# Patient Record
Sex: MALE | NOT HISPANIC OR LATINO
[De-identification: names, ages, dates, MRNs, and addresses within clinical notes are randomized per-mention and may not be internally consistent; named-entity substitution may affect disease eponyms.]

---

## 2019-09-16 ENCOUNTER — RX ONLY (RX ONLY)
Age: 49
End: 2019-09-16

## 2019-09-16 ENCOUNTER — SEE NOTE (OUTPATIENT)
Dept: URBAN - METROPOLITAN AREA CLINIC 31 | Facility: CLINIC | Age: 49
Setting detail: DERMATOLOGY
End: 2019-09-16

## 2019-09-16 DIAGNOSIS — D49.2 NEOPLASM OF UNSPECIFIED BEHAVIOR OF BONE, SOFT TISSUE, AND SKIN: ICD-10-CM

## 2019-09-16 PROBLEM — L82.1 OTHER SEBORRHEIC KERATOSIS: Status: RESOLVED | Noted: 2019-09-16

## 2019-09-16 PROBLEM — L82.1 OTHER SEBORRHEIC KERATOSIS: Status: ACTIVE | Noted: 2019-09-16

## 2019-09-16 PROBLEM — D18.01 HEMANGIOMA OF SKIN AND SUBCUTANEOUS TISSUE: Status: RESOLVED | Noted: 2019-09-16

## 2019-09-16 PROBLEM — L57.0 ACTINIC KERATOSIS: Status: RESOLVED | Noted: 2019-09-16

## 2019-09-16 PROBLEM — D18.01 HEMANGIOMA OF SKIN AND SUBCUTANEOUS TISSUE: Status: ACTIVE | Noted: 2019-09-16

## 2019-09-16 PROCEDURE — 99213 OFFICE O/P EST LOW 20 MIN: CPT

## 2019-09-16 PROCEDURE — 17000 DESTRUCT PREMALG LESION: CPT

## 2019-09-16 PROCEDURE — 17003 DESTRUCT PREMALG LES 2-14: CPT

## 2019-09-16 RX ORDER — FLUOCINOLONE ACETONIDE 0.1 MG/ML
1 ML SOLUTION TOPICAL DAILY
Qty: 60 | Refills: 0
Start: 2019-09-16

## 2019-09-16 RX ORDER — FLUOCINONIDE 0.5 MG/ML
1 ML SOLUTION TOPICAL DAILY
Qty: 20 | Refills: 0
Start: 2019-09-16

## 2019-09-16 RX ORDER — TRIAMCINOLONE ACETONIDE 1 MG/G
1 APPLICATION CREAM TOPICAL BID
Qty: 80 | Refills: 0
Start: 2019-09-16

## 2019-12-16 ENCOUNTER — RX ONLY (RX ONLY)
Age: 49
End: 2019-12-16

## 2019-12-16 RX ORDER — CLOBETASOL PROPIONATE 0.5 MG/G
1 APPLICATION CREAM TOPICAL BID
Qty: 45 | Refills: 0
Start: 2019-12-16

## 2019-12-18 ENCOUNTER — SEE NOTE (OUTPATIENT)
Dept: URBAN - METROPOLITAN AREA CLINIC 36 | Facility: CLINIC | Age: 49
Setting detail: DERMATOLOGY
End: 2019-12-18

## 2019-12-18 DIAGNOSIS — L90.5 SCAR CONDITIONS AND FIBROSIS OF SKIN: ICD-10-CM

## 2019-12-18 PROBLEM — L57.0 ACTINIC KERATOSIS: Status: RESOLVED | Noted: 2019-12-18

## 2019-12-18 PROCEDURE — 11104 PUNCH BX SKIN SINGLE LESION: CPT

## 2019-12-18 PROCEDURE — 11105 PUNCH BX SKIN EA SEP/ADDL: CPT

## 2019-12-18 PROCEDURE — 17000 DESTRUCT PREMALG LESION: CPT

## 2019-12-26 ENCOUNTER — FOLLOW UP (OUTPATIENT)
Dept: URBAN - METROPOLITAN AREA CLINIC 36 | Facility: CLINIC | Age: 49
Setting detail: DERMATOLOGY
End: 2019-12-26

## 2019-12-26 DIAGNOSIS — D22.5 MELANOCYTIC NEVI OF TRUNK: ICD-10-CM

## 2019-12-26 PROCEDURE — 99024 POSTOP FOLLOW-UP VISIT: CPT

## 2020-06-25 ENCOUNTER — RX ONLY (RX ONLY)
Age: 50
End: 2020-06-25

## 2020-06-25 RX ORDER — UREA 40 G/100G
1 APPLICATION CREAM TOPICAL BID
Qty: 200 | Refills: 2
Start: 2020-06-25

## 2022-09-08 ENCOUNTER — APPOINTMENT (OUTPATIENT)
Dept: URBAN - METROPOLITAN AREA CLINIC 206 | Age: 52
Setting detail: DERMATOLOGY
End: 2022-09-08

## 2022-09-08 DIAGNOSIS — L82.0 INFLAMED SEBORRHEIC KERATOSIS: ICD-10-CM

## 2022-09-08 PROCEDURE — OTHER BENIGN DESTRUCTION: OTHER

## 2022-09-08 PROCEDURE — OTHER ADDITIONAL NOTES: OTHER

## 2022-09-08 PROCEDURE — OTHER COUNSELING: OTHER

## 2022-09-08 PROCEDURE — 17110 DESTRUCT B9 LESION 1-14: CPT

## 2022-09-08 ASSESSMENT — LOCATION ZONE DERM: LOCATION ZONE: TRUNK

## 2022-09-08 ASSESSMENT — LOCATION SIMPLE DESCRIPTION DERM: LOCATION SIMPLE: UPPER BACK

## 2022-09-08 ASSESSMENT — LOCATION DETAILED DESCRIPTION DERM: LOCATION DETAILED: INFERIOR THORACIC SPINE

## 2022-09-08 NOTE — PROCEDURE: BENIGN DESTRUCTION
Render Note In Bullet Format When Appropriate: No
Medical Necessity Information: It is in your best interest to select a reason for this procedure from the list below. All of these items fulfill various CMS LCD requirements except the new and changing color options.
Render Post-Care Instructions In Note?: yes
Treatment Number (Will Not Render If 0): 0
Post-Care Instructions: I reviewed with the patient in detail post-care instructions. Patient is to wear sunprotection, and avoid picking at any of the treated lesions. Pt may apply Vaseline to crusted or scabbing areas.
Anesthesia Volume In Cc: 0.5
Detail Level: Detailed
Medical Necessity Clause: This procedure was medically necessary because the lesions that were treated were:
Consent: The patient's consent was obtained including but not limited to risks of crusting, scabbing, blistering, scarring, darker or lighter pigmentary change, recurrence, incomplete removal and infection.

## 2022-09-08 NOTE — PROCEDURE: ADDITIONAL NOTES
Detail Level: Simple
Render Risk Assessment In Note?: no
Additional Notes: Curetted with 3mm. Photo taken today. Instructed patient to RTO for LN2 treatment if not resolved. Wound care advised. Keep covered with Vaseline and band aid until fully healed. Avoid unsanitary water. Sun screen SPF 30+ daily. Monitor for changes.

## 2023-06-05 ENCOUNTER — APPOINTMENT (OUTPATIENT)
Dept: URBAN - METROPOLITAN AREA CLINIC 206 | Age: 53
Setting detail: DERMATOLOGY
End: 2023-06-08

## 2023-06-05 DIAGNOSIS — D485 NEOPLASM OF UNCERTAIN BEHAVIOR OF SKIN: ICD-10-CM

## 2023-06-05 DIAGNOSIS — D18.0 HEMANGIOMA: ICD-10-CM

## 2023-06-05 PROBLEM — D48.5 NEOPLASM OF UNCERTAIN BEHAVIOR OF SKIN: Status: ACTIVE | Noted: 2023-06-05

## 2023-06-05 PROBLEM — D18.01 HEMANGIOMA OF SKIN AND SUBCUTANEOUS TISSUE: Status: ACTIVE | Noted: 2023-06-05

## 2023-06-05 PROCEDURE — OTHER SHAVE REMOVAL: OTHER

## 2023-06-05 PROCEDURE — 11301 SHAVE SKIN LESION 0.6-1.0 CM: CPT

## 2023-06-05 PROCEDURE — 99212 OFFICE O/P EST SF 10 MIN: CPT | Mod: 25

## 2023-06-05 PROCEDURE — OTHER COUNSELING: OTHER

## 2023-06-05 PROCEDURE — OTHER ADDITIONAL NOTES: OTHER

## 2023-06-05 PROCEDURE — OTHER MIPS QUALITY: OTHER

## 2023-06-05 ASSESSMENT — LOCATION ZONE DERM: LOCATION ZONE: TRUNK

## 2023-06-05 ASSESSMENT — LOCATION SIMPLE DESCRIPTION DERM
LOCATION SIMPLE: LEFT UPPER BACK
LOCATION SIMPLE: ABDOMEN

## 2023-06-05 ASSESSMENT — LOCATION DETAILED DESCRIPTION DERM
LOCATION DETAILED: EPIGASTRIC SKIN
LOCATION DETAILED: LEFT MID-UPPER BACK

## 2023-06-05 NOTE — PROCEDURE: ADDITIONAL NOTES
Detail Level: Simple
Additional Notes: Base treated with liquid nitrogen
Render Risk Assessment In Note?: no
Additional Notes: Discussed removal with vbeam laser. $75 for one spot and $150 for 2-10. Not bothersome to patient at this time.

## 2023-08-28 ENCOUNTER — APPOINTMENT (OUTPATIENT)
Dept: URBAN - METROPOLITAN AREA CLINIC 207 | Age: 53
Setting detail: DERMATOLOGY
End: 2023-08-29

## 2023-08-28 DIAGNOSIS — B07.8 OTHER VIRAL WARTS: ICD-10-CM

## 2023-08-28 DIAGNOSIS — L82.1 OTHER SEBORRHEIC KERATOSIS: ICD-10-CM

## 2023-08-28 PROCEDURE — OTHER MIPS QUALITY: OTHER

## 2023-08-28 PROCEDURE — 99212 OFFICE O/P EST SF 10 MIN: CPT | Mod: 25

## 2023-08-28 PROCEDURE — OTHER LIQUID NITROGEN: OTHER

## 2023-08-28 PROCEDURE — OTHER COUNSELING: OTHER

## 2023-08-28 PROCEDURE — OTHER ADDITIONAL NOTES: OTHER

## 2023-08-28 PROCEDURE — 17110 DESTRUCT B9 LESION 1-14: CPT

## 2023-08-28 ASSESSMENT — LOCATION DETAILED DESCRIPTION DERM
LOCATION DETAILED: RIGHT PROXIMAL PRETIBIAL REGION
LOCATION DETAILED: RIGHT RADIAL DORSAL HAND

## 2023-08-28 ASSESSMENT — LOCATION ZONE DERM
LOCATION ZONE: HAND
LOCATION ZONE: LEG

## 2023-08-28 ASSESSMENT — LOCATION SIMPLE DESCRIPTION DERM
LOCATION SIMPLE: RIGHT HAND
LOCATION SIMPLE: RIGHT PRETIBIAL REGION

## 2023-08-28 NOTE — PROCEDURE: LIQUID NITROGEN
Medical Necessity Information: It is in your best interest to select a reason for this procedure from the list below. All of these items fulfill various CMS LCD requirements except the new and changing color options.
Duration Of Freeze Thaw-Cycle (Seconds): 5
Show Spray Paint Technique Variable?: Yes
Detail Level: Detailed
Post-Care Instructions: I reviewed with the patient in detail post-care instructions. Patient is to wear sunprotection, and avoid picking at any of the treated lesions. Pt may apply Vaseline to crusted or scabbing areas.
Spray Paint Text: The liquid nitrogen was applied to the skin utilizing a spray paint frosting technique.
Number Of Freeze-Thaw Cycles: 2 freeze-thaw cycles
Spray Paint Technique: No
Medical Necessity Clause: This procedure was medically necessary because the lesions that were treated were:
Consent: The patient's consent was obtained including but not limited to risks of crusting, scabbing, blistering, scarring, darker or lighter pigmentary change, recurrence, incomplete removal and infection.
Application Tool (Optional): Liquid Nitrogen Sprayer

## 2023-08-28 NOTE — PROCEDURE: ADDITIONAL NOTES
Render Risk Assessment In Note?: no
Detail Level: Generalized
Additional Notes: Recommended Amlactin lotion once a day(legs)

## 2023-10-25 ENCOUNTER — APPOINTMENT (OUTPATIENT)
Dept: URBAN - METROPOLITAN AREA CLINIC 207 | Age: 53
Setting detail: DERMATOLOGY
End: 2023-10-25

## 2023-10-25 DIAGNOSIS — B07.8 OTHER VIRAL WARTS: ICD-10-CM

## 2023-10-25 PROCEDURE — OTHER LIQUID NITROGEN: OTHER

## 2023-10-25 PROCEDURE — OTHER MIPS QUALITY: OTHER

## 2023-10-25 PROCEDURE — 17110 DESTRUCT B9 LESION 1-14: CPT

## 2023-10-25 PROCEDURE — OTHER COUNSELING: OTHER

## 2023-10-25 ASSESSMENT — LOCATION DETAILED DESCRIPTION DERM: LOCATION DETAILED: RIGHT RADIAL DORSAL HAND

## 2023-10-25 ASSESSMENT — LOCATION ZONE DERM: LOCATION ZONE: HAND

## 2023-10-25 ASSESSMENT — LOCATION SIMPLE DESCRIPTION DERM: LOCATION SIMPLE: RIGHT HAND

## 2023-11-08 ENCOUNTER — APPOINTMENT (OUTPATIENT)
Dept: URBAN - METROPOLITAN AREA CLINIC 207 | Age: 53
Setting detail: DERMATOLOGY
End: 2023-11-09

## 2023-11-08 DIAGNOSIS — B07.8 OTHER VIRAL WARTS: ICD-10-CM

## 2023-11-08 PROCEDURE — OTHER MIPS QUALITY: OTHER

## 2023-11-08 PROCEDURE — OTHER COUNSELING: OTHER

## 2023-11-08 PROCEDURE — 17110 DESTRUCT B9 LESION 1-14: CPT

## 2023-11-08 PROCEDURE — OTHER LIQUID NITROGEN: OTHER

## 2023-11-08 ASSESSMENT — LOCATION DETAILED DESCRIPTION DERM: LOCATION DETAILED: RIGHT RADIAL DORSAL HAND

## 2023-11-08 ASSESSMENT — LOCATION ZONE DERM: LOCATION ZONE: HAND

## 2023-11-08 ASSESSMENT — LOCATION SIMPLE DESCRIPTION DERM: LOCATION SIMPLE: RIGHT HAND

## 2023-11-29 ENCOUNTER — APPOINTMENT (OUTPATIENT)
Dept: URBAN - METROPOLITAN AREA CLINIC 207 | Age: 53
Setting detail: DERMATOLOGY
End: 2023-11-30

## 2023-11-29 DIAGNOSIS — B07.8 OTHER VIRAL WARTS: ICD-10-CM

## 2023-11-29 PROCEDURE — OTHER COUNSELING: OTHER

## 2023-11-29 PROCEDURE — OTHER MIPS QUALITY: OTHER

## 2023-11-29 PROCEDURE — 17110 DESTRUCT B9 LESION 1-14: CPT

## 2023-11-29 PROCEDURE — OTHER CANTHARIDIN: OTHER

## 2023-11-29 PROCEDURE — OTHER LIQUID NITROGEN: OTHER

## 2023-11-29 ASSESSMENT — LOCATION DETAILED DESCRIPTION DERM: LOCATION DETAILED: RIGHT ULNAR DORSAL HAND

## 2023-11-29 ASSESSMENT — LOCATION SIMPLE DESCRIPTION DERM: LOCATION SIMPLE: RIGHT HAND

## 2023-11-29 ASSESSMENT — LOCATION ZONE DERM: LOCATION ZONE: HAND

## 2023-11-29 NOTE — PROCEDURE: LIQUID NITROGEN
Spray Paint Technique: No
Show Applicator Variable?: Yes
Medical Necessity Clause: This procedure was medically necessary because the lesions that were treated were:
Duration Of Freeze Thaw-Cycle (Seconds): 5
Spray Paint Text: The liquid nitrogen was applied to the skin utilizing a spray paint frosting technique.
Medical Necessity Information: It is in your best interest to select a reason for this procedure from the list below. All of these items fulfill various CMS LCD requirements except the new and changing color options.
Post-Care Instructions: I reviewed with the patient in detail post-care instructions. Patient is to wear sunprotection, and avoid picking at any of the treated lesions. Pt may apply Vaseline to crusted or scabbing areas.
Number Of Freeze-Thaw Cycles: 3 freeze-thaw cycles
Application Tool (Optional): Liquid Nitrogen Sprayer
Detail Level: Detailed
Consent: The patient's consent was obtained including but not limited to risks of crusting, scabbing, blistering, scarring, darker or lighter pigmentary change, recurrence, incomplete removal and infection.

## 2024-01-22 ENCOUNTER — APPOINTMENT (OUTPATIENT)
Dept: URBAN - METROPOLITAN AREA CLINIC 207 | Age: 54
Setting detail: DERMATOLOGY
End: 2024-01-23

## 2024-01-22 DIAGNOSIS — B07.8 OTHER VIRAL WARTS: ICD-10-CM

## 2024-01-22 PROCEDURE — OTHER ADDITIONAL NOTES: OTHER

## 2024-01-22 PROCEDURE — OTHER LIQUID NITROGEN: OTHER

## 2024-01-22 PROCEDURE — OTHER MIPS QUALITY: OTHER

## 2024-01-22 PROCEDURE — OTHER CANTHARIDIN: OTHER

## 2024-01-22 PROCEDURE — OTHER COUNSELING: OTHER

## 2024-01-22 PROCEDURE — 17110 DESTRUCT B9 LESION 1-14: CPT

## 2024-01-22 ASSESSMENT — LOCATION DETAILED DESCRIPTION DERM: LOCATION DETAILED: RIGHT ULNAR DORSAL HAND

## 2024-01-22 ASSESSMENT — LOCATION SIMPLE DESCRIPTION DERM: LOCATION SIMPLE: RIGHT HAND

## 2024-01-22 ASSESSMENT — LOCATION ZONE DERM: LOCATION ZONE: HAND

## 2024-01-22 NOTE — PROCEDURE: LIQUID NITROGEN
Show Applicator Variable?: Yes
Post-Care Instructions: I reviewed with the patient in detail post-care instructions. Patient is to wear sunprotection, and avoid picking at any of the treated lesions. Pt may apply Vaseline to crusted or scabbing areas.
Medical Necessity Information: It is in your best interest to select a reason for this procedure from the list below. All of these items fulfill various CMS LCD requirements except the new and changing color options.
Detail Level: Detailed
Duration Of Freeze Thaw-Cycle (Seconds): 5
Number Of Freeze-Thaw Cycles: 3 freeze-thaw cycles
Render Note In Bullet Format When Appropriate: No
Spray Paint Text: The liquid nitrogen was applied to the skin utilizing a spray paint frosting technique.
Medical Necessity Clause: This procedure was medically necessary because the lesions that were treated were:
Consent: The patient's consent was obtained including but not limited to risks of crusting, scabbing, blistering, scarring, darker or lighter pigmentary change, recurrence, incomplete removal and infection.
Application Tool (Optional): Liquid Nitrogen Sprayer

## 2024-01-22 NOTE — PROCEDURE: ADDITIONAL NOTES
Detail Level: Simple
Additional Notes: Cantharidin(regular)applied, patient advised to wash off with soap and water in 6 hours
Render Risk Assessment In Note?: no

## 2024-01-22 NOTE — PROCEDURE: CANTHARIDIN
Cantharone Plus Duration Text (Please Remove Duration From Postcare): The patient was instructed to leave the Cantharone Plus on for 6-8 hours and then wash the area well with soap and water.
Include Z78.9 (Other Specified Conditions Influencing Health Status) As An Associated Diagnosis?: No
Cantharone Forte Duration Text (Please Remove Duration From Postcare): The patient was instructed to leave the Cantharone Forte on for 6-8 hours and then wash the area well with soap and water.
Canthacur Duration Text (Please Remove Duration From Postcare): The patient was instructed to leave the Canthacur on for 6-8 hours and then wash the area well with soap and water.
Detail Level: Detailed
Strength: Isidro
Consent: The patient's consent was obtained including but not limited to risks of crusting, scabbing, scarring, blistering, darker or lighter pigmentary change, recurrence, incomplete removal and infection.
Post-Care Instructions: I reviewed with the patient in detail post-care instructions. The patient understands that the treated areas should be washed off 6 to 8 hours after application.
Medical Necessity Information: It is in your best interest to select a reason for this procedure from the list below. All of these items fulfill various CMS LCD requirements except the new and changing color options.
Canthacur Ps Duration Text (Please Remove Duration From Postcare): The patient was instructed to leave the Canthacur PS on for 6-8 hours and then wash the area well with soap and water.
Cantharone Duration Text (Please Remove Duration From Postcare): The patient was instructed to leave the Cantharone on for 3-4 hours after the first application. Wash the area well with soap and water. We may suggest leaving the medication on for 6-8 hours at your next visit if you did not have a significant reaction from the medication the first time.
Medical Necessity Clause: This procedure was medically necessary because the lesions that were treated were:
Curette Text: Prior to application of cantharidin the lesions were lightly pared with a curette.

## 2024-09-28 NOTE — PROCEDURE: CANTHARIDIN
Cantharone Duration Text (Please Remove Duration From Postcare): The patient was instructed to leave the Cantharone on for 3-4 hours after the first application. Wash the area well with soap and water. We may suggest leaving the medication on for 6-8 hours at your next visit if you did not have a significant reaction from the medication the first time.
Include Z78.9 (Other Specified Conditions Influencing Health Status) As An Associated Diagnosis?: No
Curette Text: Prior to application of cantharidin the lesions were lightly pared with a curette.
Post-Care Instructions: I reviewed with the patient in detail post-care instructions. The patient understands that the treated areas should be washed off 6 to 8 hours after application.
Yes
Consent: The patient's consent was obtained including but not limited to risks of crusting, scabbing, scarring, blistering, darker or lighter pigmentary change, recurrence, incomplete removal and infection.
Medical Necessity Clause: This procedure was medically necessary because the lesions that were treated were:
Cantharone Plus Duration Text (Please Remove Duration From Postcare): The patient was instructed to leave the Cantharone Plus on for 6-8 hours and then wash the area well with soap and water.
Cantharone Forte Duration Text (Please Remove Duration From Postcare): The patient was instructed to leave the Cantharone Forte on for 6-8 hours and then wash the area well with soap and water.
Medical Necessity Information: It is in your best interest to select a reason for this procedure from the list below. All of these items fulfill various CMS LCD requirements except the new and changing color options.
Canthacur Duration Text (Please Remove Duration From Postcare): The patient was instructed to leave the Canthacur on for 6-8 hours and then wash the area well with soap and water.
Canthacur Ps Duration Text (Please Remove Duration From Postcare): The patient was instructed to leave the Canthacur PS on for 6-8 hours and then wash the area well with soap and water.
Strength: [facility] plus
Detail Level: Detailed